# Patient Record
Sex: FEMALE
[De-identification: names, ages, dates, MRNs, and addresses within clinical notes are randomized per-mention and may not be internally consistent; named-entity substitution may affect disease eponyms.]

---

## 2018-10-17 ENCOUNTER — HOSPITAL ENCOUNTER (EMERGENCY)
Dept: HOSPITAL 14 - H.ER | Age: 28
Discharge: HOME | End: 2018-10-17
Payer: MEDICAID

## 2018-10-17 VITALS — RESPIRATION RATE: 18 BRPM | SYSTOLIC BLOOD PRESSURE: 126 MMHG | HEART RATE: 90 BPM | DIASTOLIC BLOOD PRESSURE: 78 MMHG

## 2018-10-17 VITALS — OXYGEN SATURATION: 100 % | TEMPERATURE: 99 F

## 2018-10-17 DIAGNOSIS — S91.311A: Primary | ICD-10-CM
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
## 2018-10-17 NOTE — RAD
Date of service: 



10/17/2018



PROCEDURE:  Right Foot Radiographs.



HISTORY:

 Laceration 



COMPARISON:

None.



FINDINGS:



BONES:

Bone alignment and mineralization are normal.  There is no acute 

displaced fracture or bone destruction.



JOINTS:

Normal. 



SOFT TISSUES:

Normal. 



OTHER FINDINGS:

None.



IMPRESSION:

No acute fracture or dislocation.

## 2018-10-17 NOTE — CP.PCM.PN
Subjective





- Date & Time of Evaluation


Date of Evaluation: 10/17/18


Time of Evaluation: 12:52





Objective





- Vital Signs/Intake and Output


Vital Signs (last 24 hours): 


                                        











Temp Pulse Resp BP Pulse Ox


 


 99.0 F   108 H  19   130/79   100 


 


 10/17/18 09:51  10/17/18 09:51  10/17/18 09:51  10/17/18 09:51  10/17/18 12:45

## 2018-10-17 NOTE — CP.PCM.CON
History of Present Illness





- History of Present Illness


History of Present Illness: 





Podiatry consult note for attending Dr. Parrish.





29 Y/O F patient with no PMH seen and evaluated in the ED for laceration in her 

right foot. patient accompanied by her mother in the bedside. Patient states 

that at 7.30 am she stepped over a piece of glass. Patient states that she got 

wounded in the bottom of her right foot and felt pain immediately in the bottom 

of her right foot. She describes the pain as sharp pain, 10/10 decreased to 8/10

by when she is off weight bearing. Patient states that she had some bleeding 

from her right foot wound. Patient states that she dropped her kids to the 

school and comes immediately to the ED. Patient denies any tingling, numbness or

burning sensation in her foot. Patient denies any other pedal complaint at this 

time. he denies any recent F/N/V/C or SOB. patient states that she didn't 

receive tetanus vaccine in the last 10 years.





PMH: None


PSH: Appendectomy.


Allergies: NKDA


Social Hx: Denies smoking, EtOH use or illicit drug use.




















Review of Systems





- Review of Systems


Review of Systems: 





As per HPI





Past Patient History





- Past Social History


Smoking Status: Never Smoked





- CARDIAC


Hx Cardiac Disorders: No





- PSYCHIATRIC


Hx Substance Use: No





- SURGICAL HISTORY


Hx Appendectomy: Yes





- ANESTHESIA


Hx Anesthesia: Yes


Hx Anesthesia Reactions: No





Meds


Home Medications: 


                              Home Medication List











 Medication  Instructions  Recorded  Confirmed  Type


 


Cephalexin [cephalexin] 500 mg PO QID #27 cap 10/17/18  Rx


 


Naproxen [Naprosyn] 500 mg PO BID PRN #15 tablet 10/17/18  Rx











Allergies/Adverse Reactions: 


                                    Allergies











Allergy/AdvReac Type Severity Reaction Status Date / Time


 


No Known Allergies Allergy   Verified 10/17/18 10:13














Physical Exam





- Constitutional


Appears: Well, Non-toxic, No Acute Distress





- Head Exam


Head Exam: ATRAUMATIC, NORMOCEPHALIC





- Extremities Exam


Additional comments: 





B/L LE focused exam:





Vasc: DP/PT 2/4 b/l. Cap refill < 3 sec in all digits. Temp gradient warm to 

cool b/l. No edema. Slight contreras-wound erythema noted.





Neuro: Gross and protective sensations are intact.





Derm: Laceration noted at the plantar aspect of the right plantar medial arch. 

edges looks sharp with no foreign material or dirt seen inside the wound. Some 

bleeding noted. Slight contreras-wound erythema noted.





MSK: Pain on palpating the right plantar medial arch. Muscle power intact 5/5 in

 all groups b/l.





























- Neurological Exam


Neurological exam: Alert, Oriented x3





- Psychiatric Exam


Psychiatric exam: Normal Affect, Normal Mood





Results





- Vital Signs


Recent Vital Signs: 


                                Last Vital Signs











Temp  99.0 F   10/17/18 09:51


 


Pulse  108 H  10/17/18 09:51


 


Resp  19   10/17/18 09:51


 


BP  130/79   10/17/18 09:51


 


Pulse Ox  100   10/17/18 12:45














Assessment & Plan





- Assessment and Plan (Free Text)


Assessment: 





27 y/o F patient seen and evaluated in the ED for right foot laceration














Plan: 








Patient seen and evaluated in ED.


Plan discussed with attending Dr. Parrish.


Chart labs and vitals reviewed; Afebrile.


Discussed with the patient that she need to stitch the laceration under local 

anesthesia.


Benefits, risks and possible complications of the procedure explained to the 

patient.


Patient expressed verbal understanding.


Patient agrees to do the procedure.


Patient signed informed consent.


10 cc of 1% lidocaine injected in infiltration fashion to the plantar medial ar

ch of the right foot at the contreras-wound area.


Wound irrigated with copious amount of sterile saline


Under sterile condition, After checking the local anesthesia status plantar 

medial arch of the right foot stitched using 3-0 prolene sutures in a simple 

suture fashion.


Dressing then applied using bacitracin, DSD, kerlix and ace bandage.


Patient tolerated the procedure well with no complications.


RX Keflex 500 mg tablets PO TID.


Patient instructed to change the dressing daily with betadine and DSD.


Dispensed right surgical shoe.


Patient instructed to ambulate in a surgical shoe and to bear weight on her 

right heel.


Patient insructed to use OTC pain medications (Tylenol or Ibuprofen) in case of 

pain.


Patient expressed verbal understanding.


Patient received a dose of tetanus vaccine prescribed by the ED doctor.


Patient to F/U in the podiatry clinic.


Thank you for consulting podiatry service.





- Date & Time


Date: 10/17/18


Time: 13:05

## 2018-10-17 NOTE — ED PDOC
Lower Extremity Pain/Injury


Time Seen by Provider: 10/17/18 10:25


Chief Complaint (Nursing): Lower Extremity Problem/Injury


Chief Complaint (Provider): Lower Extremity Problem/Injury


History Per: Patient


History/Exam Limitations: no limitations


Current Symptoms Are (Timing): Still Present


Additional Complaint(s): 





Kemi Zuniga is a 28 year old female with no past medical history, who prese

nts to the emergency department after sustaining a right foot injury, onset 

prior to arrival. Patient states she was wearing sandals when she stepped on 

glass while walking. She further reports that the glass did not go through her 

shoe. Her tetanus is unknown. 





PMD: Roni Mauricio





Past Medical History


Reviewed: Historical Data, Nursing Documentation, Vital Signs


Vital Signs: 





                                Last Vital Signs











Temp  99.0 F   10/17/18 09:51


 


Pulse  108 H  10/17/18 09:51


 


Resp  19   10/17/18 09:51


 


BP  130/79   10/17/18 09:51


 


Pulse Ox  100   10/17/18 09:51














- Medical History


PMH: No Chronic Diseases





- Surgical History


Surgical History: Appendectomy





- Family History


Family History: States: Unknown Family Hx





- Home Medications


Home Medications: 


                                Ambulatory Orders











 Medication  Instructions  Recorded


 


Cephalexin [cephalexin] 500 mg PO QID #27 cap 10/17/18


 


Naproxen [Naprosyn] 500 mg PO BID PRN #15 tablet 10/17/18














- Allergies


Allergies/Adverse Reactions: 


                                    Allergies











Allergy/AdvReac Type Severity Reaction Status Date / Time


 


No Known Allergies Allergy   Verified 10/17/18 10:13














Review of Systems


ROS Statement: Except As Marked, All Systems Reviewed And Found Negative


Musculoskeletal: Positive for: Foot Pain (right foot laceration )





Physical Exam





- Reviewed


Nursing Documentation Reviewed: Yes


Vital Signs Reviewed: Yes





- Physical Exam


Appears: Positive for: Non-toxic, No Acute Distress


Head Exam: Positive for: ATRAUMATIC, NORMOCEPHALIC


Skin: Positive for: Normal Color, Warm, Dry


Eye Exam: Positive for: Normal appearance, EOMI, PERRL


ENT: Positive for: Normal ENT Inspection


Neck: Positive for: Normal, Painless ROM, Supple


Cardiovascular/Chest: Positive for: Regular Rate, Rhythm.  Negative for: Murmur


Respiratory: Positive for: Normal Breath Sounds.  Negative for: Respiratory 

Distress


Gastrointestinal/Abdominal: Positive for: Normal Exam, Soft.  Negative for: 

Tenderness


Back: Positive for: Normal Inspection.  Negative for: L CVA Tenderness, R CVA 

Tenderness, Vertebral Tenderness


Extremity: Positive for: Normal ROM, Other (3 cm laceration on right medial mid 

foot; 5/5 muscle strength; no loss of sensation; no obvious foreign body)


Neurologic/Psych: Positive for: Alert, Oriented (x3).  Negative for: Motor

/Sensory Deficits





- ECG


O2 Sat by Pulse Oximetry: 100 (RA)


Pulse Ox Interpretation: Normal





- Progress


ED Course And Treament: 





Provider placed sutures to close patient's laceration. 





- Physician Consult Information


Time Consulting Physican Contacted: 10:30


Physician Contacted: Podiatry 





Medical Decision Making


Medical Decision Making: 


Time: 10:34


Initial Impression: Foot laceration


Initial Plan: 


--ED urine pregnancy 


--Right foot x-ray


--Adacel (10-64 yrs) 0.5 ml IM


--Keflex 500 mg PO


--Lidocaine 1% (20ml) 1 ml IJ





Time: 12:18


Right foot x-ray findings: 





Accession No. : [id number]


Patient Name / ID : JUAN JOSE MATA  / [id number]


Exam Date : 10/17/2018 10:44:27 ( Approved )


Study Comment : 


Sex / Age : F  / 28Y





Creator : Astrid Soliz MD


Dictator : Astrid Soliz MD


 : 


Approver : Astrid Soliz MD


Approver2 : 





Report Date : 10/17/2018 12:18:19


My Comment : 


********************************************************************

***************





Date of service: 





10/17/2018





PROCEDURE:  Right Foot Radiographs.





HISTORY:


 Laceration 





COMPARISON:


None.





FINDINGS:





BONES:


Bone alignment and mineralization are normal.  There is no acute displaced 

fracture or bone destruction.





JOINTS:


Normal. 





SOFT TISSUES:


Normal. 





OTHER FINDINGS:


None.





IMPRESSION:


No acute fracture or dislocation.








Discharge instruction: 


Podiatry recommends Keflex for 7 days. 


Provider instructed patient to follow up with podiatry in x1 week. 





------------------------------------------------------------------------

-----------------------------------------------





Scribe Attestation:


Documented by Nelson Carlson, acting as a scribe for Lissa Ramos MD.





Provider Scribe Attestation:


All medical record entries made by the Scribe were at my direction and 

personally dictated by me. I have reviewed the chart and agree that the record 

accurately reflects my personal performance of the history, physical exam, 

medical decision making, and the department course for this patient. I have also

 personally directed, reviewed, and agree with the discharge instructions and 

disposition.





Disposition





- Clinical Impression


Clinical Impression: 


 Foot laceration








- Disposition


Referrals: 


Podiatry Clinic [Outside]


Disposition: Routine/Home


Disposition Time: 12:44


Condition: STABLE


Additional Instructions: 


FOLLOW-UP WITH PODIATRY CLINIC IN ONE WEEK.


Prescriptions: 


Cephalexin [cephalexin] 500 mg PO QID #27 cap


Naproxen [Naprosyn] 500 mg PO BID PRN #15 tablet


 PRN Reason: Pain, Moderate (4-7)


Instructions:  Laceration Repair, Laceration Repair With Stitches (DC)


Forms:  [facility] (English)
normal rate and rhythm, no chest pain and no edema.

## 2018-11-10 ENCOUNTER — HOSPITAL ENCOUNTER (EMERGENCY)
Dept: HOSPITAL 14 - H.ER | Age: 28
Discharge: HOME | End: 2018-11-10
Payer: MEDICAID

## 2018-11-10 VITALS
RESPIRATION RATE: 18 BRPM | DIASTOLIC BLOOD PRESSURE: 76 MMHG | SYSTOLIC BLOOD PRESSURE: 116 MMHG | TEMPERATURE: 97.8 F | HEART RATE: 86 BPM | OXYGEN SATURATION: 100 %
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
## 2018-11-10 DIAGNOSIS — S01.01XA: ICD-10-CM
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
## 2018-11-10 DIAGNOSIS — S09.90XA: Primary | ICD-10-CM

## 2018-11-10 NOTE — CT
Date of service: 



11/10/2018



PROCEDURE:  CT Cervical Spine without contrast



HISTORY:

FALL/HEAD INJURY



COMPARISON:

None available.



TECHNIQUE:

Axial computed tomography images were obtained of the cervical spine 

without the use of intravenous contrast. Coronal and sagittal 

reformatted images were created and reviewed.



Radiation dose:



Total exam DLP = 250.38 mGy-cm.



This CT exam was performed using one or more of the following dose 

reduction techniques: Automated exposure control, adjustment of the 

mA and/or kV according to patient size, and/or use of iterative 

reconstruction technique.



FINDINGS:



VERTEBRAE:

No fracture. Normal alignment. No destructive bony lesion.



DISCS/SPINAL CANAL/NEURAL FORAMINA:

No significant central canal or neural foraminal stenosis. Discs 

heights are grossly preserved.  No disc herniation is identified.  

C1-C2 articulation is within normal limits. 



PARASPINAL SOFT TISSUES:

No prevertebral soft tissue swelling is seen.  No adenopathy is 

noted.  Visualized thoracic inlet and thyroid gland are within normal 

limits.  Posterior nasopharynx is unremarkable.  Visualized mastoid 

air cells are unremarkable. 



OTHER FINDINGS:

None.



IMPRESSION:

No evidence of fracture malalignment.  This agrees with preliminary 

report provided by the on-call radiologist.

## 2018-11-10 NOTE — CT
Date of service: 



11/10/2018



PROCEDURE:  CT HEAD WITHOUT CONTRAST.



HISTORY:

HEAD INJURY



COMPARISON:

None available.



TECHNIQUE:

Axial computed tomography images were obtained through the head/brain 

without intravenous contrast.  



Radiation dose:



Total exam DLP = 825.28 mGy-cm.



This CT exam was performed using one or more of the following dose 

reduction techniques: Automated exposure control, adjustment of the 

mA and/or kV according to patient size, and/or use of iterative 

reconstruction technique.



FINDINGS:



HEMORRHAGE:

No intracranial hemorrhage. 



BRAIN:

No mass effect or edema.  No atrophy or chronic microvascular 

ischemic changes.



VENTRICLES:

Unremarkable. No hydrocephalus. 



CALVARIUM:

Unremarkable.



PARANASAL SINUSES:

Unremarkable as visualized. No significant inflammatory changes.



MASTOID AIR CELLS:

Unremarkable as visualized. No inflammatory changes.



OTHER FINDINGS:

There appears to be left posterior parietal scalp laceration and 

associated hematoma seen on axial images 32 through 40 series 4.  No 

radiopaque foreign body is noted.



IMPRESSION:

No evidence of intracranial hemorrhage or recent infarct.  This 

agrees with preliminary report provided by the on-call radiologist.

## 2018-11-10 NOTE — ED PDOC
HPI:  Head Injury


Time Seen by Provider: 11/10/18 03:30


Chief Complaint (Nursing): Trauma


Chief Complaint (Provider): HEAD INJURY


History Per: Patient (27 Y/O FEMALE HERE WITH HEAD INJURY TODAY AT HOME.  ADMITS

TO ETOH INTAKE.  STATES SHE DID NOT HAVE LOC.  BLOOD/LACERATION NOTED BY 

BOYFRIEND AND SHE CAME TO ED FOR EVALUATION. TETANUS UP TO DATE 3 WEEKS AGO)





Past Medical History


Reviewed: Historical Data, Nursing Documentation, Vital Signs


Vital Signs: 





                                Last Vital Signs











Temp  97.8 F   11/10/18 03:38


 


Pulse  86   11/10/18 03:38


 


Resp  18   11/10/18 03:38


 


BP  116/76   11/10/18 03:38


 


Pulse Ox  100   11/10/18 03:38














- Surgical History


Surgical History: Appendectomy





- Family History


Family History: States: Unknown Family Hx





- Home Medications


Home Medications: 


                                Ambulatory Orders











 Medication  Instructions  Recorded


 


Cephalexin [cephalexin] 500 mg PO QID #27 cap 10/17/18


 


Naproxen [Naprosyn] 500 mg PO BID PRN #15 tablet 10/17/18














- Allergies


Allergies/Adverse Reactions: 


                                    Allergies











Allergy/AdvReac Type Severity Reaction Status Date / Time


 


No Known Allergies Allergy   Verified 11/10/18 03:42














Review of Systems


ROS Statement: Except As Marked, All Systems Reviewed And Found Negative





Physical Exam





- Reviewed


Nursing Documentation Reviewed: Yes


Vital Signs Reviewed: Yes





- Physical Exam


Appears: Positive for: Well, Non-toxic, No Acute Distress


Head Exam: Positive for: NORMAL INSPECTION, NORMOCEPHALIC.  Negative for: 

ATRAUMATIC (5 CM LACERATION LINEAR POSTERIOR SCALP)


Skin: Positive for: Normal Color, Warm, DRY


Eye Exam: Positive for: EOMI, Normal appearance, PERRL


ENT: Positive for: Normal ENT Inspection


Neck: Positive for: Normal, Painless ROM


Cardiovascular/Chest: Positive for: Regular Rate, Rhythm


Respiratory: Positive for: CNT, Normal Breath Sounds


Gastrointestinal/Abdominal: Positive for: Normal Exam, Soft


Back: Positive for: Normal Inspection


Extremity: Positive for: Normal ROM


Neurologic/Psych: Positive for: Alert, Oriented





- ECG


O2 Sat by Pulse Oximetry: 100





- Progress


ED Course And Treament: 





HEAD CT: NAD


CT C SPINE: NAD





Disposition





- Clinical Impression


Clinical Impression: 


 Head injury, Laceration of head








- Patient ED Disposition


Is Patient to be Admitted: No





- Disposition


Disposition: Routine/Home


Disposition Time: 04:56


Condition: FAIR


Additional Instructions: 


RETURN TO ED AND F/U WITH PMD/URGENT CARE IN 10 DAYS FOR REMOVAL OF STAPLES.


Instructions:  Closed Head Injury, Laceration Repair With Staples (DC)





Procedure: Wound Repair





- Time Performed


Time Performed: 04:34





- Time Out


Time Out: Site verified





- Consent Obtained


Consent obtained: Verbal





- Performed by


Performed by: Mid-level Provider





- Indications


Indication(s):: Laceration





- Location


Location:: Scalp


Dimensions Length cm: 5.25 cm


Depth:: Epidermis





- Anesthetic Technique


Anesthetic Technique: Regional block


Local/Regional Anesthetic:: Lidocaine 1% w/epi





- Irrigated


Irrigated with ml of normal saline: 150ml sterile water





- Complexity


Complexity:: Simple (one layer)





- Muscle repiar layer closed with


Muscle repair layer closed with:: Tetanus up to date (ELEVEN STAPLES PLACED 

INTERRUPTED.)





- Patient tolerated procedure


Patient Tolerated Procedure:: Well